# Patient Record
Sex: FEMALE | Race: WHITE | NOT HISPANIC OR LATINO | URBAN - METROPOLITAN AREA
[De-identification: names, ages, dates, MRNs, and addresses within clinical notes are randomized per-mention and may not be internally consistent; named-entity substitution may affect disease eponyms.]

---

## 2017-03-27 ENCOUNTER — EMERGENCY (EMERGENCY)
Facility: HOSPITAL | Age: 22
LOS: 0 days | Discharge: HOME | End: 2017-03-27
Attending: EMERGENCY MEDICINE | Admitting: EMERGENCY MEDICINE
Payer: COMMERCIAL

## 2017-03-27 VITALS
RESPIRATION RATE: 18 BRPM | HEART RATE: 77 BPM | DIASTOLIC BLOOD PRESSURE: 72 MMHG | OXYGEN SATURATION: 100 % | TEMPERATURE: 100 F | SYSTOLIC BLOOD PRESSURE: 110 MMHG

## 2017-03-27 VITALS — HEIGHT: 66 IN | WEIGHT: 139.99 LBS

## 2017-03-27 DIAGNOSIS — R07.0 PAIN IN THROAT: ICD-10-CM

## 2017-03-27 DIAGNOSIS — J02.9 ACUTE PHARYNGITIS, UNSPECIFIED: ICD-10-CM

## 2017-03-27 PROCEDURE — 99283 EMERGENCY DEPT VISIT LOW MDM: CPT

## 2017-03-27 RX ORDER — IBUPROFEN 200 MG
600 TABLET ORAL ONCE
Qty: 0 | Refills: 0 | Status: COMPLETED | OUTPATIENT
Start: 2017-03-27 | End: 2017-03-27

## 2017-03-27 RX ORDER — IBUPROFEN 200 MG
1 TABLET ORAL
Qty: 15 | Refills: 0 | OUTPATIENT
Start: 2017-03-27 | End: 2017-04-01

## 2017-03-27 RX ADMIN — Medication 600 MILLIGRAM(S): at 18:44

## 2017-03-27 RX ADMIN — Medication 40 MILLIGRAM(S): at 18:43

## 2017-03-27 RX ADMIN — Medication 600 MILLIGRAM(S): at 18:45

## 2017-03-27 NOTE — ED ADULT NURSE REASSESSMENT NOTE - NS ED NURSE REASSESS COMMENT FT1
Able to swallow po fluids and own saliva. Alert, color good, skin warm and dry, respirations normal.

## 2017-03-27 NOTE — ED ADULT NURSE NOTE - OBJECTIVE STATEMENT
Hx of onset of a fever and a sore throat this past Thursday. Difficulty swallowing and talking. No nausea or vomiting and no diarrhea.

## 2017-03-28 NOTE — ED PROVIDER NOTE - ENMT, MLM
Airway patent, Nasal mucosa clear. Mouth with normal mucosa. Throat has no vesicles, no oropharyngeal exudates and uvula is midline.  no pta no signs

## 2017-03-28 NOTE — ED PROVIDER NOTE - MEDICAL DECISION MAKING DETAILS
non toci likely viral return for intractable HA persitent vomiting or new onset motor or sensory deficits.

## 2017-03-28 NOTE — ED PROVIDER NOTE - OBJECTIVE STATEMENT
pt presents with sore throat non productive cough fevever and myalgia.  denies HA or neck pain. no chest pain or sob. no abd pain. no n/v/d. no urinary f/u/d. no back pain. no motor or sensory deficits. denies drug use. no recent travel. no rash. no other acute issues symptoms or concerns   no drooling or stridor

## 2023-09-12 NOTE — ED ADULT NURSE NOTE - PRIMARY CARE PROVIDER
Left message for patient at      Telephone Information:   Mobile [phone]    to schedule procedure.  Patient to return call to [facility] Patient Line [phone].    Letter sent and message to PCP    unk